# Patient Record
Sex: MALE | URBAN - METROPOLITAN AREA
[De-identification: names, ages, dates, MRNs, and addresses within clinical notes are randomized per-mention and may not be internally consistent; named-entity substitution may affect disease eponyms.]

---

## 2022-11-14 DIAGNOSIS — G56.01 CARPAL TUNNEL SYNDROME ON RIGHT: Primary | ICD-10-CM

## 2022-11-14 DIAGNOSIS — G56.21 LESION OF RIGHT ULNAR NERVE: ICD-10-CM

## 2022-11-15 ENCOUNTER — CLINICAL SUPPORT (OUTPATIENT)
Dept: REHABILITATION | Facility: HOSPITAL | Age: 62
End: 2022-11-15
Attending: SURGERY
Payer: COMMERCIAL

## 2022-11-15 DIAGNOSIS — R53.1 WEAKNESS: ICD-10-CM

## 2022-11-15 PROCEDURE — 97110 THERAPEUTIC EXERCISES: CPT | Mod: PO

## 2022-11-15 PROCEDURE — 97165 OT EVAL LOW COMPLEX 30 MIN: CPT | Mod: PO

## 2022-11-15 NOTE — PATIENT INSTRUCTIONS
~ FOR NIGHT USE TO AVOID BENDING THE ELBOW  ~TRY AVOID SUSTAIN BENDING OF THE ELBOW  ~ IF YOU CANNOT USE THE TOWEL ROLL, TRY HUGGING A BODY PILLOW   - try this for one week to reduce numbness       - splint to wear at night for one week     no

## 2022-11-15 NOTE — PROGRESS NOTES
[facility]  Elbow  Evaluation     Date: 11/15/2022  Name: Papito Purcell  Clinic Number: [id number]    Therapy Diagnosis:   Encounter Diagnosis   Name Primary?    Weakness      Physician: Anshu Pearson MD    Physician Orders: Evaluate and treat ; 2x/wk x 4 wks   Medical Diagnosis:   Evaluation Date: 11/15/2022  Insurance Authorization Expiration date : 11/14/2023  Plan of Care Certification Period: 12/15/2022  Date of Return to MD:  in 4 weeks  DOS:     Visit # / Visits authorized: 1 / 1  Time In:415  Time Out: 5  Total Billable Time: 45 minutes    Precautions:  Standard and Fall  Subjective       Involved Side: Right wrist and elbow   Dominant Side: Right    History of Current Condition/Mechanism of Injury: Pt had surgery months ago and did not receive therapy for his arm. He feels he does not have the movement      Imaging: please see image report     Date of Injury: about 3 years   Surgical Procedure and Date: May 20, 2022, Cubital tunnel release and carpal tunnel release         Past Medical History/Physical Systems Review:   Papito Purcell  has no past medical history on file.    Papito Purcell  has no past surgical history on file.    Papito currently has no medications in their medication list.    Review of patient's allergies indicates:  Not on File         Pain:  Functional Pain Scale Rating 0-10:   0 /10 on current  n/a/10 at best  n/a/10 at worst  Location: right wrist and elbow     Patient's Goals for Therapy:  to increase motion, reduce pain,     Occupation:  works with [employer]     Working presently: employed  Duties: requires him to lift 25 pounds , but using left hand due to apprehension on using right . He did not feel he got what he needed with therapy and  does not know how much he is allowed to do with work and activities     Functional Limitations/Social History:    Previous functional status includes: Independent with all  ADLs.     Current FunctionalStatus   Home/Living environment : lives with their family      Limitation of Functional Status as follows:   ADLs/IADLs:     - patient was full functional prior to this incident .He uses all forceful activities with left hand     Objective       Pt arrived with well healed wounds, some scar tissue noted. Pt reports having numbness in the morning only , which is only the pinky and ring finger.      When he sleeps on right side, he gets numbness in hand     Wrist ROM: Right  Active   11/15/2022   Extension 56   Flexion 60   Radial Deviation full   Ulnar Deviation 35     Comments: tremoring noted     Wrist ROM: Left:  Extension: 75  Flexion:  60   RD: full  UD: 40    Elbow  ROM: Right  Active is FULL       Special Tests:   Right   11/15/2022   Tinel's Test at cubital tunnel and CT -    Extrinsic Tightness Test -   Intrinsic Tightness Test -                Strength: (RACIEL Dynamometer in lbs.) Average 3 trials, Position II:     11/15/2022 11/15/2022   Rung 2 Right  Left   RACIEL # 2 82# 92#     Comments:  strength taken with elbow extended    FOTO NEXT SESSION     Treatment     Treatment Time In/out  (separate from total time) : 10 minutes at the end of the hour     Home Exercise Program/Education:  Issued HEP (see patient instructions in EMR) and educated on modality use for pain management . Exercises were reviewed and Papito was able to demonstrate them prior to the end of the session.   Pt received a written copy of exercises to perform at home. Papito demonstrated good  understanding of the education provided.  Pt was advised to perform these exercises free of pain, and to stop performing them if pain occurs.    Patient/Family Education: role of OT, goals for OT, double booking , scheduling/cancellations - pt verbalized understanding. Discussed insurance limitations with patient.    Additional Education provided: role of CHT/OT, goals for CHT/OT, discussed insurance limitation  with patient- patient verbalized understanding       Patient received moist heat x 5 minutes to right SF to increase tissue elasticity in preparation for treatment.     Fabricated custom SF static MP flexion  orthosis , per MD orders.  Instructed in orthotic use, wear, care and precautions in detail w/ patient.            Assessment     This 62 y.o. male referred to Outpatient Occupational Therapy with diagnosis of   Encounter Diagnosis   Name Primary?    Weakness     presents with limitations as described in problem list. Patient can benefit from Occupational Therapy services for Ultrasound, moist heat, PROM, AAROM, AROM, Theraputic exercises, joint mobs, home exercise program provied with written instructions, and strengthening and Orthosis, if deemed necessary . The following goals were discussed with the patient and he is in agreement with them as to be addressed in the treatment plan.     Problem List:   Decreased function of Right UE, Decreased ROM, Increased pain, and Decreased strength      Anticipated barriers to occupational therapy:  chronic weakness  Pt has no cultural, educational or language barriers to learning provided.        Profile and History Assessment of Occupational Performance Level of Clinical Decision Making Complexity Score   Occupational Profile:   Papito Purcell is a 62 y.o. male who lives with their family and is currently employed Papito Purcell has difficulty with  ADLs and IADLs as listed previously, which  affecting his/her daily functional abilities.      Comorbidities:    has no past medical history on file.    Medical and Therapy History Review:   Brief               Performance Deficits    Physical:  Muscle Power/Strength  Muscle Endurance   Strength    Cognitive:  No Deficits    Psychosocial:    No Deficits     Clinical Decision Making:  low    Assessment Process:  Problem-Focused Assessments    Modification/Need for Assistance:  Not  Necessary    Intervention Selection:  Limited Treatment Options       low  Based on PMHX, co morbidities , data from assessments and functional level of assistance required with task and clinical presentation directly impacting function.         The following goals were discussed with the patient and patient is in agreement with them as to be addressed in the treatment plan.     Goals:       Goals to be met in 6-8  weeks:    1) Patient to be IND with HEP and modalities for pain managment.  2) Patient will  Increase Active Range of motion 15-20 degrees in hand/wrist to increase functional hand use for ADLs/work/leisure activities.  3)Pt will increase  strength 10-20 lbs. to improve functional grasp for ADLs/work/leisure activities.   4) Pt will report use of elbow strap and wrist immobilizer brace to rest tendonitis and increase functional arm use.        Plan         Certification Period/Plan of care expiration: 11/15/2022 to 12/15/2022.    Outpatient Occupational Therapy 2 times weekly for 4 weeks to include the following interventions: Paraffin, Fluidotherapy, Manual therapy/joint mobilizations, Modalities for pain management, US 3 mhz, Therapeutic exercises/activities., and Strengthening.      Melissa Dolan, MOT,  OTR/L, CHT  Occupational therapist, Certified Hand Therapist

## 2022-11-22 ENCOUNTER — CLINICAL SUPPORT (OUTPATIENT)
Dept: REHABILITATION | Facility: HOSPITAL | Age: 62
End: 2022-11-22
Attending: SURGERY
Payer: COMMERCIAL

## 2022-11-22 DIAGNOSIS — R53.1 WEAKNESS: Primary | ICD-10-CM

## 2022-11-22 PROCEDURE — 97110 THERAPEUTIC EXERCISES: CPT | Mod: PO

## 2022-11-22 PROCEDURE — 97035 APP MDLTY 1+ULTRASOUND EA 15: CPT | Mod: PO

## 2022-11-22 NOTE — PROGRESS NOTES
"                            Occupational Therapy Daily Treatment Note       Date: 11/22/2022  Name: Papito Alfaro [facility] Number: [id number]    Therapy Diagnosis:   Encounter Diagnosis   Name Primary?    Weakness Yes     Physician: Anshu Pearson MD      Physician Orders: Evaluate and treat ; 2x/wk x 4 wks   Medical Diagnosis:   Evaluation Date: 11/15/2022  Insurance Authorization Expiration date : 11/14/2023  Plan of Care Certification Period: 12/15/2022  Date of Return to MD:  in 4 weeks  DOS:     Visit # / Visits authorized: 1 / 1  Time In:12:30 pm  Time Out: 1:15 pm     Total Billable Time: 45 minutes    Precautions:  Standard and Fall  Subjective     Pt reports: " I do not have a lot of strength in this hand."     he was compliant with home exercise program given last session.   Response to previous treatment: no change       Pain: 2/10  Location: right wrist and elbow       Objective    received the following supervised modalities after being cleared for contradictions for 10 minutes:     -  Papito received paraffin bath to right  hand(s) for 10 minutes to increase blood flow, circulation, pain management and for tissue elasticity prior to therex.   - Patient received moist heat x 5 minutes to right elbow to increase tissue elasticity in preparation for treatment.             Papito   received the following direct contact modalities after being cleared for contraindications for 10 minutes:      -Patient receives ultrasound  for pain control and remold scar tissue to increase tissue elasticity @ 20 duty cycle, 3.3 Mhz, applied to wrist and  elbow scars, intensity = 0.6 w/cm2 for 10 minutes.           Papito   received the following manual therapy techniques to increase joint mobilization and soft tissue mobilization for 8 minutes:       -Performed scar massage to elbow and wrist  area for 8 minutes  to decrease dense adhesions and improve tensile glide.          Papito  participated in dynamic functional " therapeutic exercises to improve functional performance while increasing strength, endurance, ROM,  and flexibility  for 5  minutes, including:    - pad and dab  x 1 minutes  -  thumb rad abd x 10 reps   - putty pinching in 3 positions with yellow        Papito  participated in neuromuscular re-education activities to improve: Sense for 15 minutes. The following activities were included:      -ulnar nerve glides       Home Exercises and Education Provided     Education provided:  - discussed insurance limitation  - Progress towards goals  - Pt also instructed on importance of attention to postural alignment during rest and activity to decrease compensatory movement patterns.       Written Home Exercises Provided: Patient instructed to cont prior HEP.  Exercises were reviewed and Papito was able to demonstrate them prior to the end of the session.  Papito demonstrated good  understanding of the HEP provided.   .   See EMR under Patient Instructions for exercises provided prior visit.       Assessment     Pt would continue to benefit from skilled OT. Pt with good motion, decrease strength      Papito is progressing well towards his goals and there are no updates to goals at this time. Pt prognosis is Good.       The patient demonstrated proper understanding  of each exercise.Pt required verbal and tactile cues for all new nerve glides.  Pt continues to be limited in functional and leisurely pursuits. Pain limits patients participation in ADL's. Pt is not able to carryout necessary vocational tasks. Patient continues to requires cues and skilled supervision to complete HEP       Anticipated barriers to occupational therapy: none    Pt's spiritual, cultural and educational needs considered and pt agreeable to plan of care and goals.          Goals to be met in 6-8  weeks:     1) Patient to be IND with HEP and modalities for pain managment.  2) Patient will  Increase Active Range of motion 15-20 degrees in hand/wrist to  increase functional hand use for ADLs/work/leisure activities.  3)Pt will increase  strength 10-20 lbs. to improve functional grasp for ADLs/work/leisure activities.   4) Pt will report use of elbow strap and wrist immobilizer brace to rest tendonitis and increase functional arm use.           Plan            Certification Period/Plan of care expiration: 11/15/2022 to 12/15/2022.     Outpatient Occupational Therapy 2 times weekly for 4 weeks to include the following interventions: Paraffin, Fluidotherapy, Manual therapy/joint mobilizations, Modalities for pain management, US 3 mhz, Therapeutic exercises/activities., and Strengthening.        ANANDA Coronado,  OTR/L, CHT  Occupational therapist, Certified Hand Therapist  ANANDA Coronado, OTR/L, CHT

## 2022-11-25 ENCOUNTER — CLINICAL SUPPORT (OUTPATIENT)
Dept: REHABILITATION | Facility: HOSPITAL | Age: 62
End: 2022-11-25
Attending: SURGERY
Payer: COMMERCIAL

## 2022-11-25 DIAGNOSIS — R53.1 WEAKNESS: Primary | ICD-10-CM

## 2022-11-25 PROCEDURE — 97110 THERAPEUTIC EXERCISES: CPT | Mod: PO

## 2022-11-25 PROCEDURE — 97022 WHIRLPOOL THERAPY: CPT | Mod: PO

## 2022-11-25 NOTE — PATIENT INSTRUCTIONS
Bring thumb to the side ( pain-free) to pain-less range x 1 minutes            Finger Abduction: Resisted        With rubber band around _all__ fingers of right hand, gently spread fingers apart.  Repeat __20__ times per set. Do __2__ sets per session. Do ____ sessions per day.    Copyright © I. All rights reserved.           Press between small finger and ring finger and pull putty anchored with other hand. Repeat with all fingers.  Repeat __20__ times. Do __1__ sessions per day.          Place putty loop around fingers. Stretch loop by opening hand at large knuckles only. Keep thumb still and finger- tips straight.  Repeat _20___ times. Do ___2_ sessions per day       putty for 2 minutes               With wrist over edge of table, lift 3 pound , keeping arm on table surface. Hold __3__ seconds. Lower slowly.  Repeat __20__ times. Do __2__ sessions per day.

## 2022-11-25 NOTE — PROGRESS NOTES
"                            Occupational Therapy Daily Treatment Note       Date: 11/25/2022  Name: Papito Alfaro [facility] Number: [id number]    Therapy Diagnosis:   Encounter Diagnosis   Name Primary?    Weakness Yes       Physician: Anshu Pearson MD      Physician Orders: Evaluate and treat ; 2x/wk x 4 wks   Medical Diagnosis:   Evaluation Date: 11/15/2022  Insurance Authorization Expiration date : 12/31/22  Plan of Care Certification Period: 12/15/2022  Date of Return to MD:  in 4 weeks  DOS:     Visit # / Visits authorized: 2/16  Time In:12:30 pm  Time Out: 1:15 pm     Total Billable Time: 45 minutes    Precautions:  Standard and Fall  Subjective     Pt reports: " I do not have a lot of strength in this hand."     he was compliant with home exercise program given last session.   Response to previous treatment: no change       Pain: 2/10  Location: right wrist and elbow       Objective    received the following supervised modalities after being cleared for contradictions for 10 minutes:     -  Patient received fluidotherapy to right  hand(s) for 10 minutes to increase blood flow, circulation, desensitization, sensory re-education and for pain management.         Papito   received the following direct contact modalities after being cleared for contraindications for 00 minutes:           Papito   received the following manual therapy techniques to increase joint mobilization and soft tissue mobilization for 0 minutes:         Papito  participated in dynamic functional therapeutic exercises to improve functional performance while increasing strength, endurance, ROM,  and flexibility  for 5  minutes, including:    - pad and dab  x 1 minutes  -  thumb rad abd x 10 reps   - putty pinching in 3 positions with yellow   - new HEP     Papito  participated in neuromuscular re-education activities to improve: Sense for 15 minutes. The following activities were included:      -ulnar nerve glides      Strength: " (RACIEL Dynamometer in lbs.) Average 3 trials, Position II:    Pinch Strength (Measured in psi)     11/25/2022 11/25/2022    Right Left   Key Pinch 20 psi 25 psi   3pt Pinch 16 psi 16 psi            Home Exercises and Education Provided     Education provided:  - discussed insurance limitation  - Progress towards goals  - Pt also instructed on importance of attention to postural alignment during rest and activity to decrease compensatory movement patterns.       Written Home Exercises Provided: Patient instructed to cont prior HEP.  Exercises were reviewed and Papito was able to demonstrate them prior to the end of the session.  Papito demonstrated good  understanding of the HEP provided.   .   See EMR under Patient Instructions for exercises provided prior visit.       Assessment     Pt would continue to benefit from skilled OT. Nre HEP for intrinsics of the hand     Papito is progressing well towards his goals and there are no updates to goals at this time. Pt prognosis is Good.       The patient demonstrated proper understanding  of each exercise.Pt required verbal and tactile cues for all new nerve glides.  Pt continues to be limited in functional and leisurely pursuits. Pain limits patients participation in ADL's. Pt is not able to carryout necessary vocational tasks. Patient continues to requires cues and skilled supervision to complete HEP       Anticipated barriers to occupational therapy: none    Pt's spiritual, cultural and educational needs considered and pt agreeable to plan of care and goals.          Goals to be met in 6-8  weeks:     1) Patient to be IND with HEP and modalities for pain managment.  2) Patient will  Increase Active Range of motion 15-20 degrees in hand/wrist to increase functional hand use for ADLs/work/leisure activities.  3)Pt will increase  strength 10-20 lbs. to improve functional grasp for ADLs/work/leisure activities.   4) Pt will report use of elbow strap and wrist  immobilizer brace to rest tendonitis and increase functional arm use.           Plan            Certification Period/Plan of care expiration: 11/15/2022 to 12/15/2022.     Outpatient Occupational Therapy 2 times weekly for 4 weeks to include the following interventions: Paraffin, Fluidotherapy, Manual therapy/joint mobilizations, Modalities for pain management, US 3 mhz, Therapeutic exercises/activities., and Strengthening.        ANANDA Coronado,  OTR/L, CHT  Occupational therapist, Certified Hand Therapist  ANANDA Coronado, OTR/TAB, CHT

## 2022-11-29 ENCOUNTER — CLINICAL SUPPORT (OUTPATIENT)
Dept: REHABILITATION | Facility: HOSPITAL | Age: 62
End: 2022-11-29
Attending: SURGERY
Payer: COMMERCIAL

## 2022-11-29 DIAGNOSIS — R53.1 WEAKNESS: Primary | ICD-10-CM

## 2022-11-29 PROCEDURE — 97530 THERAPEUTIC ACTIVITIES: CPT | Mod: PO

## 2022-11-29 NOTE — PROGRESS NOTES
"                            Occupational Therapy Daily Treatment Note       Date: 11/29/2022  Name: Papito Alfaro [facility] Number: [id number]    Therapy Diagnosis:   Encounter Diagnosis   Name Primary?    Weakness Yes         Physician: Anshu Pearson MD      Physician Orders: Evaluate and treat ; 2x/wk x 4 wks   Medical Diagnosis: s/p carpal tunnel release and cubital tunnel release   Evaluation Date: 11/15/2022  Insurance Authorization Expiration date : 12/31/22  Plan of Care Certification Period: 12/15/2022  Date of Return to MD:  in 4 weeks  DOS:     Visit # / Visits authorized: 2/16  Time In:12:30 pm  Time Out: 1:15 pm     Total Billable Time: 45 minutes    Precautions:  Standard and Fall  Subjective     Pt reports: " I do not have a lot of strength in this hand."     he was compliant with home exercise program given last session.   Response to previous treatment: no change       Pain: 2/10  Location: right wrist and elbow       Objective    received the following supervised modalities after being cleared for contradictions for 10 minutes:     -  Patient received fluidotherapy to right  hand(s) for 10 minutes to increase blood flow, circulation, desensitization, sensory re-education and for pain management.         Papito   received the following direct contact modalities after being cleared for contraindications for 14 minutes:    Patient receives ultrasound  for pain control and remold scar tissue to increase tissue elasticity @ 100 duty cycle, 3.3 Mhz, applied to both scars, intensity = 0.6 w/cm2 for 14  minutes.         Papito   received the following manual therapy techniques to increase joint mobilization and soft tissue mobilization for  minutes:         Papito  participated in dynamic functional therapeutic exercises to improve functional performance while increasing strength, endurance, ROM,  and flexibility  for 8  minutes, including:    - pad and dab  x 1 minutes with yellow rubberband between " each finger  -  thumb rad abd x 10 reps   - putty pinching in 3 positions with yellow        Papito  participated in neuromuscular re-education activities to improve: Sense for 15 minutes. The following activities were included:      -ulnar nerve glides      Strength: (RACIEL Dynamometer in lbs.) Average 3 trials, Position II:        Pinch Strength (Measured in psi)     11/29/2022 11/29/2022    Right Left   Key Pinch 20 psi 25 psi   3pt Pinch 16 psi 16 psi            Home Exercises and Education Provided     Education provided:  - discussed insurance limitation  - Progress towards goals  - Pt also instructed on importance of attention to postural alignment during rest and activity to decrease compensatory movement patterns.       Written Home Exercises Provided: Patient instructed to cont prior HEP.  Exercises were reviewed and Papito was able to demonstrate them prior to the end of the session.  Papito demonstrated good  understanding of the HEP provided.   .   See EMR under Patient Instructions for exercises provided prior visit.       Assessment     Pt would continue to benefit from skilled OT. Has been complaint with HEP    [place] is progressing well towards his goals and there are no updates to goals at this time. Pt prognosis is Good.       The patient demonstrated proper understanding  of each exercise.Pt required verbal and tactile cues for all new nerve glides.  Pt continues to be limited in functional and leisurely pursuits. Pain limits patients participation in ADL's. Pt is not able to carryout necessary vocational tasks. Patient continues to requires cues and skilled supervision to complete HEP       Anticipated barriers to occupational therapy: none    Pt's spiritual, cultural and educational needs considered and pt agreeable to plan of care and goals.          Goals to be met in 6-8  weeks:     1) Patient to be IND with HEP and modalities for pain managment.  2) Patient will  Increase Active Range  of motion 15-20 degrees in hand/wrist to increase functional hand use for ADLs/work/leisure activities.  3)Pt will increase  strength 10-20 lbs. to improve functional grasp for ADLs/work/leisure activities.   4) Pt will report use of elbow strap and wrist immobilizer brace to rest tendonitis and increase functional arm use.           Plan            Certification Period/Plan of care expiration: 11/15/2022 to 12/15/2022.     Outpatient Occupational Therapy 2 times weekly for 4 weeks to include the following interventions: Paraffin, Fluidotherapy, Manual therapy/joint mobilizations, Modalities for pain management, US 3 mhz, Therapeutic exercises/activities., and Strengthening.        ANANDA Coronado,  OTR/L, CHT  Occupational therapist, Certified Hand Therapist  ANANDA Coronado, OTR/L, CHT

## 2022-12-06 ENCOUNTER — CLINICAL SUPPORT (OUTPATIENT)
Dept: REHABILITATION | Facility: HOSPITAL | Age: 62
End: 2022-12-06
Attending: SURGERY
Payer: COMMERCIAL

## 2022-12-06 DIAGNOSIS — R53.1 WEAKNESS: Primary | ICD-10-CM

## 2022-12-06 PROCEDURE — 97110 THERAPEUTIC EXERCISES: CPT | Mod: PO

## 2022-12-06 PROCEDURE — 97022 WHIRLPOOL THERAPY: CPT | Mod: PO

## 2022-12-06 NOTE — PROGRESS NOTES
"                            Occupational Therapy Daily Treatment Note       Date: 12/6/2022  Name: Papito Alfaro [facility] Number: [id number]    Therapy Diagnosis:   Encounter Diagnosis   Name Primary?    Weakness Yes         Physician: Anshu Pearson MD      Physician Orders: Evaluate and treat ; 2x/wk x 4 wks   Medical Diagnosis: s/p carpal tunnel release and cubital tunnel release   Evaluation Date: 11/15/2022  Insurance Authorization Expiration date : 12/31/22  Plan of Care Certification Period: 12/15/2022  Date of Return to MD:  in 4 weeks  DOS:     Visit # / Visits authorized: 2/16  Time In:12:30 pm  Time Out: 1:15 pm     Total Billable Time: 45 minutes    Precautions:  Standard and Fall  Subjective     Pt reports: " I have not been doing the putty as much."     he was compliant with home exercise program given last session.   Response to previous treatment: no change       Pain: 2/10  Location: right wrist and elbow       Objective    received the following supervised modalities after being cleared for contradictions for 10 minutes:     -  Patient received fluidotherapy to right  hand(s) for 10 minutes to increase blood flow, circulation, desensitization, sensory re-education and for pain management.       Papito  participated in dynamic functional therapeutic exercises to improve functional performance while increasing strength, endurance, ROM,  and flexibility  for 30   minutes, including:    - pad and dab  x 1 minutes with yellow rubberband between each finger  -  thumb rad abd x 10 reps   - putty pinching in 3 positions with yellow   - UBE x 6 minutes  - blue CP key pinch minutes   - 3 jaw with blue digi-flex x 2 minutes   - tennis ball slides  - first dorsal interosseus with yellow band x 1 minutes        Papito  participated in neuromuscular re-education activities to improve: Sense for 2 minutes. The following activities were included:      -ulnar nerve glides      Strength: (RACIEL Dynamometer " in lbs.) Average 3 trials, Position II:        Pinch Strength (Measured in psi)     12/6/2022 12/6/2022    Right Left   Key Pinch 20 psi 22 psi   3pt Pinch 17 psi 16 psi            Home Exercises and Education Provided     Education provided:  - discussed insurance limitation  - Progress towards goals  - Pt also instructed on importance of attention to postural alignment during rest and activity to decrease compensatory movement patterns.       Written Home Exercises Provided: Patient instructed to cont prior HEP.  Exercises were reviewed and Papito was able to demonstrate them prior to the end of the session.  Papito demonstrated good  understanding of the HEP provided.   .   See EMR under Patient Instructions for exercises provided prior visit.       Assessment     Pt would continue to benefit from skilled OT. Has been complaint with HEP    Edrenee is progressing well towards his goals and there are no updates to goals at this time. Pt prognosis is Good.       The patient demonstrated proper understanding  of each exercise.Pt required verbal and tactile cues for all new nerve glides.  Pt continues to be limited in functional and leisurely pursuits. Pain limits patients participation in ADL's. Pt is not able to carryout necessary vocational tasks. Patient continues to requires cues and skilled supervision to complete HEP       Anticipated barriers to occupational therapy: none    Pt's spiritual, cultural and educational needs considered and pt agreeable to plan of care and goals.          Goals to be met in 6-8  weeks:     1) Patient to be IND with HEP and modalities for pain managment.  2) Patient will  Increase Active Range of motion 15-20 degrees in hand/wrist to increase functional hand use for ADLs/work/leisure activities.  3)Pt will increase  strength 10-20 lbs. to improve functional grasp for ADLs/work/leisure activities.   4) Pt will report use of elbow strap and wrist immobilizer brace to rest  tendonitis and increase functional arm use.           Plan            Certification Period/Plan of care expiration: 11/15/2022 to 12/15/2022.     Outpatient Occupational Therapy 2 times weekly for 4 weeks to include the following interventions: Paraffin, Fluidotherapy, Manual therapy/joint mobilizations, Modalities for pain management, US 3 mhz, Therapeutic exercises/activities., and Strengthening.        ANANDA Coronado,  OTR/L, CHT  Occupational therapist, Certified Hand Therapist  ANANDA Coronado, OTFREDI/L, CHT

## 2022-12-08 ENCOUNTER — CLINICAL SUPPORT (OUTPATIENT)
Dept: REHABILITATION | Facility: HOSPITAL | Age: 62
End: 2022-12-08
Attending: SURGERY
Payer: COMMERCIAL

## 2022-12-08 DIAGNOSIS — R53.1 WEAKNESS: Primary | ICD-10-CM

## 2022-12-08 PROCEDURE — 97110 THERAPEUTIC EXERCISES: CPT | Mod: PO

## 2022-12-08 NOTE — PATIENT INSTRUCTIONS
Hold hammer weighing __16__ ounces and rotate palm up and down. Keep elbow flexed at side and wrist straight.  Repeat __15__ times. Do __2__ sessions per day. EVERY OTHER DAY

## 2022-12-08 NOTE — PROGRESS NOTES
"                              Occupational Therapy Daily Treatment Note       Date: 12/8/2022  Name: Papito Alfaro [facility] Number: [id number]    Therapy Diagnosis:   Encounter Diagnosis   Name Primary?    Weakness Yes           Physician: Anshu Pearson MD      Physician Orders: Evaluate and treat ; 2x/wk x 4 wks   Medical Diagnosis: s/p carpal tunnel release and cubital tunnel release   Evaluation Date: 11/15/2022  Insurance Authorization Expiration date : 12/31/22  Plan of Care Certification Period: 12/15/2022  Date of Return to MD:  in 4 weeks  DOS: 5-    Visit # / Visits authorized: 5/16  Time In: 3 pm  Time Out: 4 pm     Total Billable Time: 60 minutes    Precautions:  Standard and Fall  Subjective     Pt reports: " I tried to keep my elbow straight ."     he was compliant with home exercise program given last session.   Response to previous treatment: no change       Pain: 2/10  Location: right wrist and elbow       Objective    received the following supervised modalities after being cleared for contradictions for 10 minutes:     -  Patient received fluidotherapy to right  hand(s) for 10 minutes to increase blood flow, circulation, desensitization, sensory re-education and for pain management.       Papito  participated in dynamic functional therapeutic exercises to improve functional performance while increasing strength, endurance, ROM,  and flexibility  for 30   minutes, including:    - pad and dab  x 1 minutes with yellow rubberband between each finger  -  thumb rad abd x 10 reps   - putty pinching in 3 positions with yellow   -  sup/pro with 4#wt   - 3 pt , with thumb , RF and SF with yellow  putty   - blue CP key pinch minutes   - 3 jaw with blue digi-flex x 2 minutes   - tennis ball slides  - first dorsal interosseus with yellow band x 1 minutes   - UD with 4#wt x 20 reps   - went over towel or brace placement for elbow for tral and error            Home Exercises and Education Provided "     Education provided:  - discussed insurance limitation  - Progress towards goals  - Pt also instructed on importance of attention to postural alignment during rest and activity to decrease compensatory movement patterns.       Written Home Exercises Provided: Patient instructed to cont prior HEP.  Exercises were reviewed and Papito was able to demonstrate them prior to the end of the session.  Papito demonstrated good  understanding of the HEP provided.   .   See EMR under Patient Instructions for exercises provided prior visit.       Assessment     Pt would continue to benefit from skilled OT. Has been complaint with HEP    Edrenee is progressing well towards his goals and there are no updates to goals at this time. Pt prognosis is Good.       The patient demonstrated proper understanding  of each exercise.Pt required verbal and tactile cues for all new nerve glides.  Pt continues to be limited in functional and leisurely pursuits. Pain limits patients participation in ADL's. Pt is not able to carryout necessary vocational tasks. Patient continues to requires cues and skilled supervision to complete HEP       Anticipated barriers to occupational therapy: none    Pt's spiritual, cultural and educational needs considered and pt agreeable to plan of care and goals.          Goals to be met in 6-8  weeks:     1) Patient to be IND with HEP and modalities for pain managment.  2) Patient will  Increase Active Range of motion 15-20 degrees in hand/wrist to increase functional hand use for ADLs/work/leisure activities.  3)Pt will increase  strength 10-20 lbs. to improve functional grasp for ADLs/work/leisure activities.   4) Pt will report use of elbow strap and wrist immobilizer brace to rest tendonitis and increase functional arm use.           Plan            Certification Period/Plan of care expiration: 11/15/2022 to 12/15/2022.     Outpatient Occupational Therapy 2 times weekly for 4 weeks to include the  following interventions: Paraffin, Fluidotherapy, Manual therapy/joint mobilizations, Modalities for pain management, US 3 mhz, Therapeutic exercises/activities., and Strengthening.        ANANDA Coronado,  OTR/L, CHT  Occupational therapist, Certified Hand Therapist  ANANDA Coronado, OTR/L, CHT

## 2022-12-13 ENCOUNTER — CLINICAL SUPPORT (OUTPATIENT)
Dept: REHABILITATION | Facility: HOSPITAL | Age: 62
End: 2022-12-13
Attending: SURGERY
Payer: COMMERCIAL

## 2022-12-13 DIAGNOSIS — R53.1 WEAKNESS: Primary | ICD-10-CM

## 2022-12-13 PROCEDURE — 97530 THERAPEUTIC ACTIVITIES: CPT | Mod: PO

## 2022-12-13 NOTE — PROGRESS NOTES
"                              Occupational Therapy Daily Treatment Note       Date: 12/13/2022  Name: Papito Alfaro [facility] Number: [id number]    Therapy Diagnosis:   Encounter Diagnosis   Name Primary?    Weakness Yes           Physician: Anshu Pearson MD      Physician Orders: Evaluate and treat ; 2x/wk x 4 wks   Medical Diagnosis: s/p carpal tunnel release and cubital tunnel release   Evaluation Date: 11/15/2022  Insurance Authorization Expiration date : 12/31/22  Plan of Care Certification Period: 12/15/2022  Date of Return to MD:  in 4 weeks  DOS: 5-    Visit # / Visits authorized: 6/16  Time In: 330 pm  Time Out: 430 pm     Total Billable Time: 60 minutes    Precautions:  Standard and Fall  Subjective     Pt reports: " I have been using the brace at night ."     he was compliant with home exercise program given last session.   Response to previous treatment: no change       Pain: 2/10  Location: right wrist and elbow       Objective    received the following supervised modalities after being cleared for contradictions for 10 minutes:     -  Patient received fluidotherapy to right  hand(s) for 10 minutes to increase blood flow, circulation, desensitization, sensory re-education and for pain management.       Papito  participated in dynamic functional therapeutic exercises to improve functional performance while increasing strength, endurance, ROM,  and flexibility  for 30   minutes, including:    - pad and dab  x 1 minutes with yellow rubberband between each finger  -  thumb rad abd x 10 reps   - putty pinching in 3 positions with yellow   -  sup/pro with 4#wt   - 3 pt , with thumb , RF and SF with yellow  putty   - blue CP key pinch minutes   - 3 jaw with blue digi-flex x 2 minutes   - tennis ball slides  - first dorsal interosseus with yellow band x 1 minutes   - UD with 4#wt x 20 reps          Strength: (RACIEL Dynamometer in lbs.) Average 3 trials, Position II:     12/13/2022 12/13/2022 "   Rung2 Right  Left   RACIEL # 23 83# 95#         Pinch Strength (Measured in psi)     12/16/2022 12/13/2022    Right Left   Key Pinch 21 psi 22 psi   3pt Pinch 18 psi 16 psi              Home Exercises and Education Provided     Education provided:  - discussed insurance limitation  - Progress towards goals  - Pt also instructed on importance of attention to postural alignment during rest and activity to decrease compensatory movement patterns.       Written Home Exercises Provided: Patient instructed to cont prior HEP.  Exercises were reviewed and Papito was able to demonstrate them prior to the end of the session.  Papito demonstrated good  understanding of the HEP provided.   .   See EMR under Patient Instructions for exercises provided prior visit.       Assessment     Pt would continue to benefit from skilled OT. Pt has been using elbow brace for 2 night now with no relief of stiff hand     Papito is progressing well towards his goals and there are no updates to goals at this time. Pt prognosis is Good.       The patient demonstrated proper understanding  of each exercise.Pt required verbal and tactile cues for all new nerve glides.  Pt continues to be limited in functional and leisurely pursuits. Pain limits patients participation in ADL's. Pt is not able to carryout necessary vocational tasks. Patient continues to requires cues and skilled supervision to complete HEP       Anticipated barriers to occupational therapy: none    Pt's spiritual, cultural and educational needs considered and pt agreeable to plan of care and goals.          Goals to be met in 6-8  weeks:     1) Patient to be IND with HEP and modalities for pain managment.  2) Patient will  Increase Active Range of motion 15-20 degrees in hand/wrist to increase functional hand use for ADLs/work/leisure activities.  3)Pt will increase  strength 10-20 lbs. to improve functional grasp for ADLs/work/leisure activities.   4) Pt will report use of  elbow strap and wrist immobilizer brace to rest tendonitis and increase functional arm use.           Plan            Certification Period/Plan of care expiration: 11/15/2022 to 12/15/2022.     Outpatient Occupational Therapy 2 times weekly for 4 weeks to include the following interventions: Paraffin, Fluidotherapy, Manual therapy/joint mobilizations, Modalities for pain management, US 3 mhz, Therapeutic exercises/activities., and Strengthening.        ANANDA Coronado,  OTR/L, CHT  Occupational therapist, Certified Hand Therapist  ANANDA Coronado, OTR/L, CHT

## 2023-01-05 ENCOUNTER — CLINICAL SUPPORT (OUTPATIENT)
Dept: REHABILITATION | Facility: HOSPITAL | Age: 63
End: 2023-01-05
Attending: SURGERY
Payer: COMMERCIAL

## 2023-01-05 DIAGNOSIS — R53.1 WEAKNESS: Primary | ICD-10-CM

## 2023-01-05 PROCEDURE — 97022 WHIRLPOOL THERAPY: CPT | Mod: PO

## 2023-01-05 PROCEDURE — 97110 THERAPEUTIC EXERCISES: CPT | Mod: PO

## 2023-01-05 NOTE — PROGRESS NOTES
"                              Occupational Therapy Daily Treatment Note       Date: 1/5/2023  Name: Papito Alfaro [facility] Number: [id number]    Therapy Diagnosis:   Encounter Diagnosis   Name Primary?    Weakness Yes             Physician: Anshu Pearson MD      Physician Orders: Evaluate and treat ; 2x/wk x 4 wks   Medical Diagnosis: s/p carpal tunnel release and cubital tunnel release   Evaluation Date: 11/15/2022  Insurance Authorization Expiration date : 12/31/22  Plan of Care Certification Period: 12/15/2022  Date of Return to MD:  in 4 weeks  DOS: 5-    Visit # / Visits authorized: 7/16  Time In: 3 pm  Time Out:345 pm     Total Billable Time: 60 minutes    Precautions:  Standard and Fall  Subjective     Pt reports: "  I have not time to do much of the exercises."      he was compliant with home exercise program given last session.   Response to previous treatment: no change       Pain: 2/10  Location: right wrist and elbow       Objective    received the following supervised modalities after being cleared for contradictions for 10 minutes:     -  Patient received fluidotherapy to right  hand(s) for 10 minutes to increase blood flow, circulation, desensitization, sensory re-education and for pain management.       Papito  participated in dynamic functional therapeutic exercises to improve functional performance while increasing strength, endurance, ROM,  and flexibility  for 30   minutes, including:    - pad and dab  x 1 minutes with yellow rubberband between each finger  -  thumb rad abd x 10 reps   - putty pinching in 3 positions with yellow   -  sup/pro with 4#wt   - 3 pt , with thumb , RF and SF with yellow  putty   - blue CP key pinch minutes   - 3 jaw with blue digi-flex x 2 minutes   - tennis ball slides  - first dorsal interosseus with yellow band x 1 minutes   - UD with 4#wt x 20 reps          Strength: (RACIEL Dynamometer in lbs.) Average 3 trials, Position II:     1/5/2023 1/5/2023 "   Rung2 Right  Left   RACIEL # 23 83# 95#         Pinch Strength (Measured in psi)     1/5/2022 1/5/2022    Right Left   Key Pinch 21 psi 22 psi   3pt Pinch 18 psi 16 psi              Home Exercises and Education Provided     Education provided:  - discussed insurance limitation  - Progress towards goals  - Pt also instructed on importance of attention to postural alignment during rest and activity to decrease compensatory movement patterns.       Written Home Exercises Provided: Patient instructed to cont prior HEP.  Exercises were reviewed and Papito was able to demonstrate them prior to the end of the session.  Papito demonstrated good  understanding of the HEP provided.   .   See EMR under Patient Instructions for exercises provided prior visit.       Assessment     Pt would continue to benefit from skilled OT. Pt feels he's made a great progress with therapy .    Papito is progressing well towards his goals and there are no updates to goals at this time. Pt prognosis is Good.       The patient demonstrated proper understanding  of each exercise.Pt required verbal and tactile cues for all new nerve glides.        Anticipated barriers to occupational therapy: none    Pt's spiritual, cultural and educational needs considered and pt agreeable to plan of care and goals.          Goals to be met in 6-8  weeks:     1) Patient to be IND with HEP and modalities for pain managment.(Met)  2) Patient will  Increase Active Range of motion 15-20 degrees in hand/wrist to increase functional hand use for ADLs/work/leisure activities.(Met)  3)Pt will increase  strength 10-20 lbs. to improve functional grasp for ADLs/work/leisure activities. (met)  4) Pt will report use of elbow strap and wrist immobilizer brace to rest tendonitis and increase functional arm use.(Met)           Plan         Pt with good motion, good strength . Discharged at this time as he is fully functional. No limitations noted today.